# Patient Record
Sex: MALE | Race: WHITE | NOT HISPANIC OR LATINO | Employment: OTHER | URBAN - METROPOLITAN AREA
[De-identification: names, ages, dates, MRNs, and addresses within clinical notes are randomized per-mention and may not be internally consistent; named-entity substitution may affect disease eponyms.]

---

## 2024-02-14 ENCOUNTER — TELEPHONE (OUTPATIENT)
Dept: UROLOGY | Facility: CLINIC | Age: 64
End: 2024-02-14
Payer: OTHER GOVERNMENT

## 2024-02-14 NOTE — TELEPHONE ENCOUNTER
Called pt. Call was unsuccessful. Couldn't leave a vm for pt to call the office. Called the second number. The pt mother stated that he does not live there no more.

## 2024-04-12 ENCOUNTER — TELEPHONE (OUTPATIENT)
Dept: UROLOGY | Facility: CLINIC | Age: 64
End: 2024-04-12
Payer: OTHER GOVERNMENT

## 2024-04-12 NOTE — TELEPHONE ENCOUNTER
----- Message from Archie Carbajal Jr., MD sent at 4/11/2024  1:21 PM CDT -----    ----- Message -----  From: Archie Carbajal Jr., MD  Sent: 4/11/2024  10:19 AM CDT  To: Archie Carbajal Jr., MD    IPP that needs rescheduling. Thanks.

## 2024-04-17 ENCOUNTER — TELEPHONE (OUTPATIENT)
Dept: UROLOGY | Facility: CLINIC | Age: 64
End: 2024-04-17
Payer: OTHER GOVERNMENT

## 2024-04-17 NOTE — TELEPHONE ENCOUNTER
----- Message from Drea Cool sent at 4/17/2024  8:43 AM CDT -----  Regarding: Sooner Appointment Reschedule Request  Contact: patient at [phone]  Type:  Sooner Appointment Reschedule Request    Name of Caller:  patient at [phone]    When is the first available appointment?  August 2024    Additional Information:  Patient returning call. Calling to reschedule appointment from 4/10 due to weather. Please call and advise. Thank you

## 2024-04-18 ENCOUNTER — TELEPHONE (OUTPATIENT)
Dept: UROLOGY | Facility: CLINIC | Age: 64
End: 2024-04-18
Payer: OTHER GOVERNMENT

## 2024-04-18 NOTE — TELEPHONE ENCOUNTER
----- Message from Renee Gaytan sent at 4/18/2024  8:24 AM CDT -----  Type:  Patient Returning Call    Who Called:  pt   Who Left Message for Patient:  nurse   Does the patient know what this is regarding?:  yes   Best Call Back Number:  [phone]  Additional Information:  please advise

## 2024-04-19 ENCOUNTER — OFFICE VISIT (OUTPATIENT)
Dept: UROLOGY | Facility: CLINIC | Age: 64
End: 2024-04-19
Payer: OTHER GOVERNMENT

## 2024-04-19 ENCOUNTER — LAB VISIT (OUTPATIENT)
Dept: LAB | Facility: HOSPITAL | Age: 64
End: 2024-04-19
Attending: UROLOGY
Payer: OTHER GOVERNMENT

## 2024-04-19 VITALS — BODY MASS INDEX: 20.1 KG/M2 | HEIGHT: 67 IN | WEIGHT: 128.06 LBS

## 2024-04-19 DIAGNOSIS — N52.8 OTHER MALE ERECTILE DYSFUNCTION: Primary | ICD-10-CM

## 2024-04-19 DIAGNOSIS — N52.8 OTHER MALE ERECTILE DYSFUNCTION: ICD-10-CM

## 2024-04-19 LAB
ESTIMATED AVG GLUCOSE: 111 MG/DL (ref 68–131)
HBA1C MFR BLD: 5.5 % (ref 4.5–6.2)

## 2024-04-19 PROCEDURE — 99999 PR PBB SHADOW E&M-EST. PATIENT-LVL III: CPT | Mod: PBBFAC,,, | Performed by: UROLOGY

## 2024-04-19 PROCEDURE — 36415 COLL VENOUS BLD VENIPUNCTURE: CPT | Performed by: UROLOGY

## 2024-04-19 PROCEDURE — 83036 HEMOGLOBIN GLYCOSYLATED A1C: CPT | Performed by: UROLOGY

## 2024-04-19 PROCEDURE — 99213 OFFICE O/P EST LOW 20 MIN: CPT | Mod: PBBFAC,PO | Performed by: UROLOGY

## 2024-04-19 PROCEDURE — 99215 OFFICE O/P EST HI 40 MIN: CPT | Mod: S$PBB,,, | Performed by: UROLOGY

## 2024-04-19 RX ORDER — FLUCONAZOLE 2 MG/ML
200 INJECTION, SOLUTION INTRAVENOUS
OUTPATIENT
Start: 2024-04-19

## 2024-04-19 RX ORDER — LEVOFLOXACIN 750 MG/1
750 TABLET ORAL DAILY
Qty: 5 TABLET | Refills: 0 | Status: SHIPPED | OUTPATIENT
Start: 2024-04-19 | End: 2024-04-24

## 2024-04-19 RX ORDER — VANCOMYCIN/0.9 % SOD CHLORIDE 1 G/100 ML
1000 PLASTIC BAG, INJECTION (ML) INTRAVENOUS
OUTPATIENT
Start: 2024-04-19

## 2024-04-19 NOTE — PROGRESS NOTES
Procedure Order to Urology [[id number]]    Electronically signed by: Archie Carbajal Jr., MD on 04/19/24 0940 Status: Active   Ordering user: Archie Carbajal Jr., MD 04/19/24 0940 Authorized by: Archie Carbajal Jr., MD   Ordering mode: Standard   Frequency:  04/19/24 -     Diagnoses  Other male erectile dysfunction [N52.8]   Questionnaire    Question Answer   Procedure Penile Prosthesis   Is patient on anti-coagulants? Yes   Pre-op Diagnosis Other male erectile dysfunction   Facility Name: [place]   Order comments: IPP on 5/14/24 at [facility].   Please order outpt OR , will stay overnight in comments on case, NPO , General anesthesia, start IV, please order CBC, BMP,U/A and culture, EKG and Chest x ray, Pt / INR please order Vancomycin 1 gram IV , Gentamycin 160mg IV and Diflucan 200mg mg IV on arrival. Please add low risk VTE and SCD and WILFRED hose. Cpt [id number]

## 2024-04-19 NOTE — PROGRESS NOTES
"[facility] Urology New Patient/H&P:    Emery Hawkins is a 63 y.o. male who presents for erectile dysfunction.    Patient with a history of stroke on ASA and Plavix, HLD, HTN and depression who presents for a several year history of progressively worsening erectile dysfunction.     He states that he has been unable to obtain an erection sufficient for intercourse refractory to Viagra and Cialis. Has not tried a vacuum erection device or self-injections. Desires penile prosthesis.     On review of records, he was managed by Dr. Noel in the past for elevated PSA. He is s/p negative prostate biopsy on 6/1/20.     Retired from [employer] . Smokes 1 ppd for over 40 years.      Denies any fever, chills, gross hematuria, flank pain, bone pain, unintentional weight loss,  trauma or history of  malignancy.         PSA  1.7  11/28/23  6.5  5/12/20  7.1  2/19/20    Testosterone  551  11/28/23    A1C  5.3  7/6/22      Past Medical History:   Diagnosis Date    Depression     Hypercholesterolemia     Hypertension     Sleep apnea     Vitamin D deficiency        Past Surgical History:   Procedure Laterality Date    COLONOSCOPY  ~2006    PROSTATE BIOPSY  2007    TRANSRECTAL BIOPSY OF PROSTATE WITH ULTRASOUND GUIDANCE N/A 6/1/2020    Procedure: BIOPSY, PROSTATE, RECTAL APPROACH, WITH US GUIDANCE;  Surgeon: Yuridia Noel MD;  Location: [facility];  Service: Urology;  Laterality: N/A;       No family history on file.    Review of patient's allergies indicates:   Allergen Reactions    Codeine Nausea And Vomiting       Medications Reviewed: see MAR      FOCUSED PHYSICAL EXAM:    There were no vitals filed for this visit.  Body mass index is 20.06 kg/m². Weight: 58.1 kg (128 lb 1.4 oz) Height: 5' 7" (170.2 cm)       General: Alert, cooperative, no distress, appears stated age  Abdomen: Soft, non-tender, no CVA tenderness, non-distended  : Circumcised, normal phallus without " plaques/lesions, bilaterally descended testicles without lesions      LABS:    No results found for this or any previous visit (from the past 336 hour(s)).        Assessment/Diagnosis:    1. Other male erectile dysfunction  HEMOGLOBIN A1C    Procedure Order to Urology          Plans:    - I spent 40 minutes of the day of this encounter preparing for, treating and managing this patient. Extensive discussion with patient regarding the etiology and management of erectile dysfunction. Explained that erectile dysfunction is multi-factorial and can be secondary to neurologic dysfunction, prolactinoma, advanced age, cardiac disease, hypertension,  trauma, DM, renal disease, substance abuse, hypogonadism, Peyronie's disease, post-surgery, medications, depression and anxiety. We discussed that treatment is contingent on finding the cause of his ED. Treatments include PDE - 5 inhibitors (e.g., Cialis, Viagra), vacuum erection device, tension rings, self-injections, transurethral suppositories and penile prosthesis.   - Hemoglobin A1C today.   - Scheduled for IPP on 5/14 tentatively. Levaquin RX sent. Instructions handed to patient. Needs clearance from his PCP Dr. Patrick to hold ASA and Plavix prior.

## 2024-05-13 ENCOUNTER — TELEPHONE (OUTPATIENT)
Dept: UROLOGY | Facility: CLINIC | Age: 64
End: 2024-05-13
Payer: OTHER GOVERNMENT

## 2024-05-13 NOTE — TELEPHONE ENCOUNTER
Spoke with pt. Procedure on 5/14 is oh hold due to not being cleared by cardiology. Pt verbalized understanding.

## 2024-05-13 NOTE — TELEPHONE ENCOUNTER
Spoke with pt. Informed pt that [facility] will contact him to schedule an appt with cardiology. And I [facility]. Mr Arciniega and the pt verbalized understanding.

## 2024-05-13 NOTE — TELEPHONE ENCOUNTER
----- Message from Katy Castro sent at 5/13/2024 10:26 AM CDT -----  Regarding: Call back  Type:  Needs Medical Advice    Who Called: [facility]     Would the patient rather a call back or a response via embraasener? Call back    Best Call Back Number: [phone]    Additional Information: Please call back in regards to pre op clearance form. Thank you

## 2025-01-17 DIAGNOSIS — I73.9 PERIPHERAL VASCULAR DISEASE, UNSPECIFIED: Primary | ICD-10-CM

## 2025-03-14 ENCOUNTER — OFFICE VISIT (OUTPATIENT)
Dept: CARDIOLOGY | Facility: CLINIC | Age: 65
End: 2025-03-14
Payer: OTHER GOVERNMENT

## 2025-03-14 VITALS
BODY MASS INDEX: 21.28 KG/M2 | DIASTOLIC BLOOD PRESSURE: 72 MMHG | OXYGEN SATURATION: 97 % | SYSTOLIC BLOOD PRESSURE: 117 MMHG | HEIGHT: 67 IN | WEIGHT: 135.56 LBS | HEART RATE: 79 BPM

## 2025-03-14 DIAGNOSIS — I10 HYPERTENSION, UNSPECIFIED TYPE: ICD-10-CM

## 2025-03-14 DIAGNOSIS — I73.9 PERIPHERAL VASCULAR DISEASE, UNSPECIFIED: Primary | ICD-10-CM

## 2025-03-14 DIAGNOSIS — E78.5 HYPERLIPIDEMIA, UNSPECIFIED HYPERLIPIDEMIA TYPE: ICD-10-CM

## 2025-03-14 PROCEDURE — 99999 PR PBB SHADOW E&M-EST. PATIENT-LVL IV: CPT | Mod: PBBFAC,,, | Performed by: INTERNAL MEDICINE

## 2025-03-14 PROCEDURE — 99214 OFFICE O/P EST MOD 30 MIN: CPT | Mod: PBBFAC,PN | Performed by: INTERNAL MEDICINE

## 2025-03-14 RX ORDER — ASPIRIN 81 MG/1
81 TABLET ORAL ONCE
COMMUNITY
Start: 2024-08-28

## 2025-03-14 NOTE — PROGRESS NOTES
Subjective:    Patient ID:  Emery Hawkins is a 64 y.o. male patient here for evaluation Peripheral Vascular Disease ([facility])      History of Present Illness:     64-year-old male here for establishment of [facility]  records not available to review.  Patient with past medical history of peripheral artery disease, CVA, dyslipidemia, hypertension, chronic smoking.  Denies any significant claudication symptoms currently.  Reports 1 stent placement in the right lower extremity in the past.  Denies any exertional chest pain or dyspnea.  Compliant with the medications.      Review of patient's allergies indicates:   Allergen Reactions    Codeine Nausea And Vomiting       Past Medical History:   Diagnosis Date    Depression     Hypercholesterolemia     Hypertension     Sleep apnea     Vitamin D deficiency      Past Surgical History:   Procedure Laterality Date    COLONOSCOPY  ~2006    PROSTATE BIOPSY  2007    TRANSRECTAL BIOPSY OF PROSTATE WITH ULTRASOUND GUIDANCE N/A 6/1/2020    Procedure: BIOPSY, PROSTATE, RECTAL APPROACH, WITH US GUIDANCE;  Surgeon: Yuridia Noel MD;  Location: [facility];  Service: Urology;  Laterality: N/A;     Social History[1]     Review of Systems   Negative except as mentioned in HPI         Objective        Vitals:    03/14/25 1046   BP: 117/72   Pulse: 79       LIPIDS - LAST 2   Lab Results   Component Value Date    CHOL 124 07/06/2022    HDL 32 (L) 07/06/2022    LDLCALC 76.2 07/06/2022    TRIG 79 07/06/2022    CHOLHDL 25.8 07/06/2022       CBC - LAST 2  Lab Results   Component Value Date    WBC 7.50 11/28/2023    WBC 6.27 07/08/2022    RBC 4.89 11/28/2023    RBC 5.13 07/08/2022    HGB 14.7 11/28/2023    HGB 15.7 07/08/2022    HCT 43.8 11/28/2023    HCT 45.6 07/08/2022    MCV 90 11/28/2023    MCV 89 07/08/2022    MCH 30.1 11/28/2023    MCH 30.6 07/08/2022    MCHC 33.6 11/28/2023    MCHC 34.4 07/08/2022    RDW 12.3 11/28/2023    RDW 13.2 07/08/2022      11/28/2023     07/08/2022    MPV 9.2 11/28/2023    MPV 10.0 07/08/2022    GRAN 3.7 07/08/2022    GRAN 58.6 07/08/2022    LYMPH 2.0 07/08/2022    LYMPH 32.1 07/08/2022    MONO 0.4 07/08/2022    MONO 5.9 07/08/2022    BASO 0.04 07/08/2022    BASO 0.04 07/07/2022    NRBC 0 07/08/2022    NRBC 0 07/07/2022       CHEMISTRY & LIVER FUNCTION - LAST 2  Lab Results   Component Value Date     11/28/2023     (L) 07/08/2022    K 3.9 11/28/2023    K 3.5 07/08/2022     11/28/2023     07/08/2022    CO2 26 11/28/2023    CO2 22 (L) 07/08/2022    ANIONGAP 10 11/28/2023    ANIONGAP 9 07/08/2022    BUN 15 11/28/2023    BUN 14 07/08/2022    CREATININE 0.8 11/28/2023    CREATININE 0.9 12/01/2022     (H) 11/28/2023     (H) 07/08/2022    CALCIUM 9.2 11/28/2023    CALCIUM 9.0 07/08/2022    MG 1.9 07/07/2022    ALBUMIN 3.7 07/08/2022    ALBUMIN 3.4 (L) 07/07/2022    PROT 6.6 07/08/2022    PROT 6.2 07/07/2022    ALKPHOS 56 07/08/2022    ALKPHOS 50 (L) 07/07/2022    ALT 16 07/08/2022    ALT 15 07/07/2022    AST 18 07/08/2022    AST 15 07/07/2022    BILITOT 0.6 07/08/2022    BILITOT 0.7 07/07/2022        CARDIAC PROFILE - LAST 2  Lab Results   Component Value Date    CPKMB 0.5 07/07/2022    TROPONINI <0.030 07/07/2022        COAGULATION - LAST 2  Lab Results   Component Value Date    LABPT 14.1 (H) 07/07/2022    LABPT 13.4 07/06/2022    INR 1.2 07/07/2022    INR 1.1 07/06/2022    APTT 32.8 07/07/2022    APTT 33.2 07/06/2022       ENDOCRINE & PSA - LAST 2  Lab Results   Component Value Date    HGBA1C 5.5 04/19/2024    HGBA1C 5.3 07/06/2022    TSH 0.730 12/01/2022    TSH 1.120 07/06/2022        ECHOCARDIOGRAM RESULTS  Results for orders placed during the hospital encounter of 07/06/22    Echo Saline Bubble? Yes    Interpretation Summary  · The left ventricle is normal in size with normal systolic function.  · The estimated ejection fraction is 60%.  · Normal left ventricular diastolic function.  ·  Normal right ventricular size with normal right ventricular systolic function.  · Normal central venous pressure (3 mmHg).  · The estimated PA systolic pressure is 25 mmHg.      CURRENT/PREVIOUS VISIT EKG  Results for orders placed or performed during the hospital encounter of 05/26/20   EKG 12-lead    Collection Time: 05/26/20  2:25 PM    Narrative    Test Reason : Z01.818,Z01.818,    Vent. Rate : 077 BPM     Atrial Rate : 077 BPM     P-R Int : 170 ms          QRS Dur : 080 ms      QT Int : 368 ms       P-R-T Axes : 035 032 023 degrees     QTc Int : 416 ms    Normal sinus rhythm  Normal ECG  No previous ECGs available  Confirmed by Sylvain IGNACIO, Emery ROBERTS ([id number]) on 5/29/2020 10:35:04 AM    Referred By:  YESSITERMANINDER           Confirmed By:Emery Narayan MD     No valid procedures specified.   No results found for this or any previous visit.    No valid procedures specified.          PREVIOUS STRESS TEST              PREVIOUS ANGIOGRAM        PHYSICAL EXAM    CONSTITUTIONAL: Well built, well nourished in no apparent distress  HEENT: No pallor  NECK: no JVD  LUNGS: CTA b/l  HEART: regular rate and rhythm, S1, S2 normal, no murmur   ABDOMEN:  Nondistended  EXTREMITIES: No edema  NEURO: AAO X 3   SKIN:  No rash  Psych:  Normal affect    I HAVE REVIEWED :    The vital signs, nurses notes, and all the pertinent recent radiology studies, cardiovascular studies and labs.  I have independently reviewed the patient's most recent EKG.        Current Outpatient Medications   Medication Instructions    ARIPiprazole (ABILIFY) 5 mg, Oral, Nightly    aspirin (ECOTRIN) 81 mg, Once    buPROPion (WELLBUTRIN XL) 150 mg    calcium carbonate (OS-JO-ANN) 500 mg calcium (1,250 mg) tablet 1 tablet, Daily    calcium-vitamin D3 (OS-JO- + D3) 500 mg(1,250mg) -200 unit per tablet 1 tablet, 2 times daily with meals    cetirizine (ZYRTEC) 10 mg    cholecalciferol (vitamin D3) (VITAMIN D3) 1,000 Units, Daily    clopidogreL (PLAVIX) 75 mg    EScitalopram  oxalate (LEXAPRO) 10 mg, Oral, Daily    finasteride (PROSCAR) 5 mg    gabapentin (NEURONTIN) 100 mg, 3 times daily    lisinopriL 5 mg    metoprolol succinate (TOPROL-XL) 25 mg    mirtazapine (REMERON) 15 mg, Nightly    nicotine (NICODERM CQ) 21 mg/24 hr 1 patch, Transdermal, Daily    rosuvastatin (CRESTOR) 40 MG Tab 1 tablet, Daily    sildenafiL (VIAGRA) 100 mg, Oral, Daily PRN    tamsulosin (FLOMAX) 0.4 mg, Nightly        ECG reviewed by me: NSR  Assessment     Peripheral vascular disease, unspecified  -     Ambulatory referral/consult to Cardiology  -     IN OFFICE EKG 12-LEAD (to [place] [facility])  -     Echo Saline Bubble? No; Ultrasound enhancing contrast? No; Future; Expected date: 03/21/2025  -     Lipid Panel; Future; Expected date: 03/14/2025    Hypertension, unspecified type    Hyperlipidemia, unspecified hyperlipidemia type       History of CVA    Smoking      64-year-old male here [facility] to [facility].  Patient with past medical history of peripheral artery disease, CVA, dyslipidemia, hypertension, chronic smoking.  Denies any significant claudication symptoms currently.  Reports 1 stent placement in the right lower extremity in the past.  Denies any exertional chest pain or dyspnea.  Compliant with the medications.  Plan     Continue statin, metoprolol, lisinopril, aspirin, Plavix   Echocardiogram   Recheck lipid profile  Counseled regarding smoking cessation  Follow up in about 1 month (around 4/14/2025).              [1]   Social History  Tobacco Use    Smoking status: Every Day     Current packs/day: 0.50     Average packs/day: 0.5 packs/day for 40.0 years (20.0 ttl pk-yrs)     Types: Cigarettes    Smokeless tobacco: Never   Substance Use Topics    Alcohol use: No    Drug use: No

## 2025-03-25 ENCOUNTER — HOSPITAL ENCOUNTER (OUTPATIENT)
Dept: CARDIOLOGY | Facility: HOSPITAL | Age: 65
Discharge: HOME | End: 2025-03-25
Attending: INTERNAL MEDICINE
Payer: OTHER GOVERNMENT

## 2025-03-25 VITALS — WEIGHT: 135 LBS | HEIGHT: 67 IN | BODY MASS INDEX: 21.19 KG/M2

## 2025-03-25 DIAGNOSIS — I73.9 PERIPHERAL VASCULAR DISEASE, UNSPECIFIED: ICD-10-CM

## 2025-03-25 PROCEDURE — 93306 TTE W/DOPPLER COMPLETE: CPT

## 2025-03-29 LAB
AORTIC ROOT ANNULUS: 2.9 CM
AORTIC VALVE CUSP SEPERATION: 1.4 CM
APICAL FOUR CHAMBER EJECTION FRACTION: 61 %
AV INDEX (PROSTH): 0.88
AV MEAN GRADIENT: 2 MMHG
AV PEAK GRADIENT: 4 MMHG
AV VALVE AREA BY VELOCITY RATIO: 3.1 CM²
AV VALVE AREA: 3.1 CM²
AV VELOCITY RATIO: 0.9
BSA FOR ECHO PROCEDURE: 1.7 M2
CV ECHO LV RWT: 0.61 CM
DOP CALC AO PEAK VEL: 1 M/S
DOP CALC AO VTI: 23.4 CM
DOP CALC LVOT AREA: 3.5 CM2
DOP CALC LVOT DIAMETER: 2.1 CM
DOP CALC LVOT PEAK VEL: 0.9 M/S
DOP CALC LVOT STROKE VOLUME: 71.7 CM3
DOP CALCLVOT PEAK VEL VTI: 20.7 CM
E WAVE DECELERATION TIME: 232 MSEC
E/A RATIO: 1.38
E/E' RATIO: 7 M/S
ECHO LV POSTERIOR WALL: 1.1 CM (ref 0.6–1.1)
FRACTIONAL SHORTENING: 30.6 % (ref 28–44)
INTERVENTRICULAR SEPTUM: 1.1 CM (ref 0.6–1.1)
IVRT: 100 MSEC
LEFT ATRIUM SIZE: 3.3 CM
LEFT INTERNAL DIMENSION IN SYSTOLE: 2.5 CM (ref 2.1–4)
LEFT VENTRICLE DIASTOLIC VOLUME INDEX: 31.58 ML/M2
LEFT VENTRICLE DIASTOLIC VOLUME: 54 ML
LEFT VENTRICLE END DIASTOLIC VOLUME APICAL 4 CHAMBER: 39.6 ML
LEFT VENTRICLE MASS INDEX: 72.6 G/M2
LEFT VENTRICLE SYSTOLIC VOLUME INDEX: 12.3 ML/M2
LEFT VENTRICLE SYSTOLIC VOLUME: 21 ML
LEFT VENTRICULAR INTERNAL DIMENSION IN DIASTOLE: 3.6 CM (ref 3.5–6)
LEFT VENTRICULAR MASS: 124.1 G
LV LATERAL E/E' RATIO: 6.7 M/S
LV SEPTAL E/E' RATIO: 7.3 M/S
LVED V (TEICH): 54.1 ML
LVES V (TEICH): 21.2 ML
LVOT MG: 2 MMHG
LVOT MV: 0.61 CM/S
MV PEAK A VEL: 0.58 M/S
MV PEAK E VEL: 0.8 M/S
MV STENOSIS PRESSURE HALF TIME: 66 MS
MV VALVE AREA P 1/2 METHOD: 3.33 CM2
OHS CV RV/LV RATIO: 0.69 CM
PISA TR MAX VEL: 2 M/S
RIGHT VENTRICLE DIASTOLIC BASEL DIMENSION: 2.5 CM
RIGHT VENTRICULAR END-DIASTOLIC DIMENSION: 2.54 CM
TDI LATERAL: 0.12 M/S
TDI SEPTAL: 0.11 M/S
TDI: 0.12 M/S
TR MAX PG: 16 MMHG
Z-SCORE OF LEFT VENTRICULAR DIMENSION IN END DIASTOLE: -2.77
Z-SCORE OF LEFT VENTRICULAR DIMENSION IN END SYSTOLE: -1.28

## 2025-04-03 ENCOUNTER — OFFICE VISIT (OUTPATIENT)
Dept: CARDIOLOGY | Facility: CLINIC | Age: 65
End: 2025-04-03
Payer: OTHER GOVERNMENT

## 2025-04-03 VITALS
SYSTOLIC BLOOD PRESSURE: 116 MMHG | WEIGHT: 132.25 LBS | DIASTOLIC BLOOD PRESSURE: 75 MMHG | HEART RATE: 73 BPM | HEIGHT: 67 IN | BODY MASS INDEX: 20.76 KG/M2

## 2025-04-03 DIAGNOSIS — I73.9 PERIPHERAL VASCULAR DISEASE, UNSPECIFIED: Primary | ICD-10-CM

## 2025-04-03 DIAGNOSIS — E78.5 HYPERLIPIDEMIA, UNSPECIFIED HYPERLIPIDEMIA TYPE: ICD-10-CM

## 2025-04-03 DIAGNOSIS — I10 HYPERTENSION, UNSPECIFIED TYPE: ICD-10-CM

## 2025-04-03 PROCEDURE — 99999 PR PBB SHADOW E&M-EST. PATIENT-LVL IV: CPT | Mod: PBBFAC,,, | Performed by: INTERNAL MEDICINE

## 2025-04-03 PROCEDURE — 99214 OFFICE O/P EST MOD 30 MIN: CPT | Mod: PBBFAC,PN | Performed by: INTERNAL MEDICINE

## 2025-04-03 NOTE — PROGRESS NOTES
Subjective:    Patient ID:  Emery Hawkins is a 64 y.o. male patient here for evaluation Results and Peripheral Arterial Disease      History of Present Illness:     64-year-old male here for [facility]  [facility] not available to review.  Patient with past medical history of peripheral artery disease, CVA, dyslipidemia, hypertension, chronic smoking.  Denies any significant claudication symptoms currently.  Reports 1 stent placement in the right lower extremity in the past.  Denies any exertional chest pain or dyspnea.  Compliant with the medications.      Review of patient's allergies indicates:   Allergen Reactions    Codeine Nausea And Vomiting       Past Medical History:   Diagnosis Date    Depression     Hypercholesterolemia     Hypertension     Sleep apnea     Vitamin D deficiency      Past Surgical History:   Procedure Laterality Date    COLONOSCOPY  ~2006    PROSTATE BIOPSY  2007    TRANSRECTAL BIOPSY OF PROSTATE WITH ULTRASOUND GUIDANCE N/A 6/1/2020    Procedure: BIOPSY, PROSTATE, RECTAL APPROACH, WITH US GUIDANCE;  Surgeon: Yuridia Noel MD;  Location[facility];  Service: Urology;  Laterality: N/A;     Social History[1]     Review of Systems   Negative except as mentioned in HPI         Objective        Vitals:    04/03/25 1105   BP: 116/75   Pulse: 73       LIPIDS - LAST 2   Lab Results   Component Value Date    CHOL 124 07/06/2022    HDL 32 (L) 07/06/2022    LDLCALC 76.2 07/06/2022    TRIG 79 07/06/2022    CHOLHDL 25.8 07/06/2022       CBC - LAST 2  Lab Results   Component Value Date    WBC 7.50 11/28/2023    WBC 6.27 07/08/2022    RBC 4.89 11/28/2023    RBC 5.13 07/08/2022    HGB 14.7 11/28/2023    HGB 15.7 07/08/2022    HCT 43.8 11/28/2023    HCT 45.6 07/08/2022    MCV 90 11/28/2023    MCV 89 07/08/2022    MCH 30.1 11/28/2023    MCH 30.6 07/08/2022    MCHC 33.6 11/28/2023    MCHC 34.4 07/08/2022    RDW 12.3 11/28/2023    RDW 13.2 07/08/2022     11/28/2023      07/08/2022    MPV 9.2 11/28/2023    MPV 10.0 07/08/2022    GRAN 3.7 07/08/2022    GRAN 58.6 07/08/2022    LYMPH 2.0 07/08/2022    LYMPH 32.1 07/08/2022    MONO 0.4 07/08/2022    MONO 5.9 07/08/2022    BASO 0.04 07/08/2022    BASO 0.04 07/07/2022    NRBC 0 07/08/2022    NRBC 0 07/07/2022       CHEMISTRY & LIVER FUNCTION - LAST 2  Lab Results   Component Value Date     11/28/2023     (L) 07/08/2022    K 3.9 11/28/2023    K 3.5 07/08/2022     11/28/2023     07/08/2022    CO2 26 11/28/2023    CO2 22 (L) 07/08/2022    ANIONGAP 10 11/28/2023    ANIONGAP 9 07/08/2022    BUN 15 11/28/2023    BUN 14 07/08/2022    CREATININE 0.8 11/28/2023    CREATININE 0.9 12/01/2022     (H) 11/28/2023     (H) 07/08/2022    CALCIUM 9.2 11/28/2023    CALCIUM 9.0 07/08/2022    MG 1.9 07/07/2022    ALBUMIN 3.7 07/08/2022    ALBUMIN 3.4 (L) 07/07/2022    PROT 6.6 07/08/2022    PROT 6.2 07/07/2022    ALKPHOS 56 07/08/2022    ALKPHOS 50 (L) 07/07/2022    ALT 16 07/08/2022    ALT 15 07/07/2022    AST 18 07/08/2022    AST 15 07/07/2022    BILITOT 0.6 07/08/2022    BILITOT 0.7 07/07/2022        CARDIAC PROFILE - LAST 2  Lab Results   Component Value Date    CPKMB 0.5 07/07/2022    TROPONINI <0.030 07/07/2022        COAGULATION - LAST 2  Lab Results   Component Value Date    LABPT 14.1 (H) 07/07/2022    LABPT 13.4 07/06/2022    INR 1.2 07/07/2022    INR 1.1 07/06/2022    APTT 32.8 07/07/2022    APTT 33.2 07/06/2022       ENDOCRINE & PSA - LAST 2  Lab Results   Component Value Date    HGBA1C 5.5 04/19/2024    HGBA1C 5.3 07/06/2022    TSH 0.730 12/01/2022    TSH 1.120 07/06/2022        ECHOCARDIOGRAM RESULTS  Results for orders placed during the hospital encounter of 07/06/22    Echo Saline Bubble? Yes    Interpretation Summary  · The left ventricle is normal in size with normal systolic function.  · The estimated ejection fraction is 60%.  · Normal left ventricular diastolic function.  · Normal right  ventricular size with normal right ventricular systolic function.  · Normal central venous pressure (3 mmHg).  · The estimated PA systolic pressure is 25 mmHg.      CURRENT/PREVIOUS VISIT EKG  Results for orders placed or performed in visit on 03/14/25   IN OFFICE EKG 12-LEAD (to [place])    Collection Time: 03/14/25 10:40 AM   Result Value Ref Range    QRS Duration 74 ms    [facility] QTC Calculation 413 ms    Narrative    Test Reason : I73.9,    Vent. Rate :  72 BPM     Atrial Rate :  72 BPM     P-R Int : 150 ms          QRS Dur :  74 ms      QT Int : 378 ms       P-R-T Axes :  65  48  68 degrees    QTcB Int : 413 ms    Normal sinus rhythm  Normal ECG    Confirmed by Kong Sweet ([id number]) on 3/18/2025 2:24:13 PM    Referred By: LESLIE CASTRO III           Confirmed By: Kong Sweet     No valid procedures specified.   No results found for this or any previous visit.    No valid procedures specified.          PREVIOUS STRESS TEST              PREVIOUS ANGIOGRAM        PHYSICAL EXAM    CONSTITUTIONAL: Well built, well nourished in no apparent distress  HEENT: No pallor  NECK: no JVD  LUNGS: CTA b/l  HEART: regular rate and rhythm, S1, S2 normal, no murmur   ABDOMEN:  Nondistended  EXTREMITIES: No edema  NEURO: AAO X 3   SKIN:  No rash  Psych:  Normal affect    I HAVE REVIEWED :    The vital signs, nurses notes, and all the pertinent recent radiology studies, cardiovascular studies and labs.  I have independently reviewed the patient's most recent EKG.        Current Outpatient Medications   Medication Instructions    ARIPiprazole (ABILIFY) 5 mg, Oral, Nightly    aspirin (ECOTRIN) 81 mg, Once    buPROPion (WELLBUTRIN XL) 150 mg    calcium carbonate (OS-JO-ANN) 500 mg calcium (1,250 mg) tablet 1 tablet, Daily    calcium-vitamin D3 (OS-JO- + D3) 500 mg(1,250mg) -200 unit per tablet 1 tablet, 2 times daily with meals    cetirizine (ZYRTEC) 10 mg    cholecalciferol (vitamin D3) (VITAMIN D3) 1,000 Units, Daily     clopidogreL (PLAVIX) 75 mg    EScitalopram oxalate (LEXAPRO) 10 mg, Oral, Daily    finasteride (PROSCAR) 5 mg    gabapentin (NEURONTIN) 100 mg, 3 times daily    lisinopriL 5 mg    metoprolol succinate (TOPROL-XL) 25 mg    mirtazapine (REMERON) 15 mg, Nightly    nicotine (NICODERM CQ) 21 mg/24 hr 1 patch, Transdermal, Daily    rosuvastatin (CRESTOR) 40 MG Tab 1 tablet, Daily    sildenafiL (VIAGRA) 100 mg, Oral, Daily PRN    tamsulosin (FLOMAX) 0.4 mg, Nightly        Assessment     Peripheral vascular disease, unspecified    Hypertension, unspecified type    Hyperlipidemia, unspecified hyperlipidemia type      History of CVA    Smoking      64-year-old male here [facility] to [facility].  Patient with past medical history of peripheral artery disease, CVA, dyslipidemia, hypertension, chronic smoking.  Denies any significant claudication symptoms currently.  Reports 1 stent placement in the right lower extremity in the past.  Denies any exertional chest pain or dyspnea.  Compliant with the medications.  Plan   Echo reviewed.  Normal LV function.  No major valvular heart disease.    Repeat lipid profile pending.  Continue statin, metoprolol, lisinopril, aspirin, Plavix   Blood pressure well controlled  smoking cessation  Follow up with primary care  Follow up in about 6 months (around 10/3/2025).                [1]   Social History  Tobacco Use    Smoking status: Every Day     Current packs/day: 0.50     Average packs/day: 0.5 packs/day for 40.0 years (20.0 ttl pk-yrs)     Types: Cigarettes    Smokeless tobacco: Never   Substance Use Topics    Alcohol use: No    Drug use: No

## 2025-06-02 ENCOUNTER — RESULTS FOLLOW-UP (OUTPATIENT)
Dept: CARDIOLOGY | Facility: CLINIC | Age: 65
End: 2025-06-02